# Patient Record
Sex: FEMALE | Race: OTHER | NOT HISPANIC OR LATINO | URBAN - METROPOLITAN AREA
[De-identification: names, ages, dates, MRNs, and addresses within clinical notes are randomized per-mention and may not be internally consistent; named-entity substitution may affect disease eponyms.]

---

## 2024-02-22 ENCOUNTER — INPATIENT (INPATIENT)
Facility: HOSPITAL | Age: 34
LOS: 4 days | Discharge: HOME | End: 2024-02-27
Attending: OBSTETRICS & GYNECOLOGY | Admitting: OBSTETRICS & GYNECOLOGY
Payer: MEDICAID

## 2024-02-22 VITALS
RESPIRATION RATE: 18 BRPM | HEART RATE: 87 BPM | OXYGEN SATURATION: 98 % | TEMPERATURE: 98 F | SYSTOLIC BLOOD PRESSURE: 119 MMHG | DIASTOLIC BLOOD PRESSURE: 60 MMHG

## 2024-02-22 DIAGNOSIS — O26.899 OTHER SPECIFIED PREGNANCY RELATED CONDITIONS, UNSPECIFIED TRIMESTER: ICD-10-CM

## 2024-02-22 DIAGNOSIS — O14.90 UNSPECIFIED PRE-ECLAMPSIA, UNSPECIFIED TRIMESTER: ICD-10-CM

## 2024-02-22 DIAGNOSIS — Z34.80 ENCOUNTER FOR SUPERVISION OF OTHER NORMAL PREGNANCY, UNSPECIFIED TRIMESTER: ICD-10-CM

## 2024-02-22 DIAGNOSIS — Z90.49 ACQUIRED ABSENCE OF OTHER SPECIFIED PARTS OF DIGESTIVE TRACT: Chronic | ICD-10-CM

## 2024-02-22 LAB
ALBUMIN SERPL ELPH-MCNC: 2.5 G/DL — LOW (ref 3.5–5)
ALP SERPL-CCNC: 93 U/L — SIGNIFICANT CHANGE UP (ref 40–120)
ALT FLD-CCNC: 23 U/L DA — SIGNIFICANT CHANGE UP (ref 10–60)
ANION GAP SERPL CALC-SCNC: 6 MMOL/L — SIGNIFICANT CHANGE UP (ref 5–17)
APPEARANCE UR: CLEAR — SIGNIFICANT CHANGE UP
APTT BLD: 25.5 SEC — SIGNIFICANT CHANGE UP (ref 24.5–35.6)
AST SERPL-CCNC: 16 U/L — SIGNIFICANT CHANGE UP (ref 10–40)
BACTERIA # UR AUTO: ABNORMAL /HPF
BASOPHILS # BLD AUTO: 0.04 K/UL — SIGNIFICANT CHANGE UP (ref 0–0.2)
BASOPHILS NFR BLD AUTO: 0.3 % — SIGNIFICANT CHANGE UP (ref 0–2)
BILIRUB SERPL-MCNC: 0.2 MG/DL — SIGNIFICANT CHANGE UP (ref 0.2–1.2)
BILIRUB UR-MCNC: NEGATIVE — SIGNIFICANT CHANGE UP
BLD GP AB SCN SERPL QL: SIGNIFICANT CHANGE UP
BUN SERPL-MCNC: 12 MG/DL — SIGNIFICANT CHANGE UP (ref 7–18)
CALCIUM SERPL-MCNC: 9.2 MG/DL — SIGNIFICANT CHANGE UP (ref 8.4–10.5)
CHLORIDE SERPL-SCNC: 108 MMOL/L — SIGNIFICANT CHANGE UP (ref 96–108)
CO2 SERPL-SCNC: 21 MMOL/L — LOW (ref 22–31)
COLOR SPEC: YELLOW — SIGNIFICANT CHANGE UP
COMMENT - URINE: SIGNIFICANT CHANGE UP
CREAT ?TM UR-MCNC: 88 MG/DL — SIGNIFICANT CHANGE UP
CREAT SERPL-MCNC: 0.66 MG/DL — SIGNIFICANT CHANGE UP (ref 0.5–1.3)
DIFF PNL FLD: NEGATIVE — SIGNIFICANT CHANGE UP
EGFR: 119 ML/MIN/1.73M2 — SIGNIFICANT CHANGE UP
EOSINOPHIL # BLD AUTO: 0.37 K/UL — SIGNIFICANT CHANGE UP (ref 0–0.5)
EOSINOPHIL NFR BLD AUTO: 3.2 % — SIGNIFICANT CHANGE UP (ref 0–6)
EPI CELLS # UR: PRESENT
FIBRINOGEN PPP-MCNC: 422 MG/DL — SIGNIFICANT CHANGE UP (ref 200–475)
GLUCOSE SERPL-MCNC: 100 MG/DL — HIGH (ref 70–99)
GLUCOSE UR QL: NEGATIVE MG/DL — SIGNIFICANT CHANGE UP
GRAN CASTS # UR COMP ASSIST: SIGNIFICANT CHANGE UP
HCT VFR BLD CALC: 29.6 % — LOW (ref 34.5–45)
HGB BLD-MCNC: 9.9 G/DL — LOW (ref 11.5–15.5)
HYALINE CASTS # UR AUTO: PRESENT
IMM GRANULOCYTES NFR BLD AUTO: 1 % — HIGH (ref 0–0.9)
INR BLD: 0.9 RATIO — SIGNIFICANT CHANGE UP (ref 0.85–1.18)
KETONES UR-MCNC: NEGATIVE MG/DL — SIGNIFICANT CHANGE UP
LDH SERPL L TO P-CCNC: 149 U/L — SIGNIFICANT CHANGE UP (ref 120–225)
LEUKOCYTE ESTERASE UR-ACNC: ABNORMAL
LYMPHOCYTES # BLD AUTO: 1.91 K/UL — SIGNIFICANT CHANGE UP (ref 1–3.3)
LYMPHOCYTES # BLD AUTO: 16.6 % — SIGNIFICANT CHANGE UP (ref 13–44)
MCHC RBC-ENTMCNC: 29.7 PG — SIGNIFICANT CHANGE UP (ref 27–34)
MCHC RBC-ENTMCNC: 33.4 GM/DL — SIGNIFICANT CHANGE UP (ref 32–36)
MCV RBC AUTO: 88.9 FL — SIGNIFICANT CHANGE UP (ref 80–100)
MONOCYTES # BLD AUTO: 1.27 K/UL — HIGH (ref 0–0.9)
MONOCYTES NFR BLD AUTO: 11 % — SIGNIFICANT CHANGE UP (ref 2–14)
NEUTROPHILS # BLD AUTO: 7.8 K/UL — HIGH (ref 1.8–7.4)
NEUTROPHILS NFR BLD AUTO: 67.9 % — SIGNIFICANT CHANGE UP (ref 43–77)
NITRITE UR-MCNC: NEGATIVE — SIGNIFICANT CHANGE UP
NRBC # BLD: 0 /100 WBCS — SIGNIFICANT CHANGE UP (ref 0–0)
PH UR: 6 — SIGNIFICANT CHANGE UP (ref 5–8)
PLATELET # BLD AUTO: 268 K/UL — SIGNIFICANT CHANGE UP (ref 150–400)
POTASSIUM SERPL-MCNC: 3.6 MMOL/L — SIGNIFICANT CHANGE UP (ref 3.5–5.3)
POTASSIUM SERPL-SCNC: 3.6 MMOL/L — SIGNIFICANT CHANGE UP (ref 3.5–5.3)
PROT ?TM UR-MCNC: 35 MG/DL — HIGH (ref 0–12)
PROT ?TM UR-MCNC: 35 MG/DL — HIGH (ref 0–12)
PROT SERPL-MCNC: 6.5 G/DL — SIGNIFICANT CHANGE UP (ref 6–8.3)
PROT UR-MCNC: ABNORMAL MG/DL
PROT/CREAT UR-RTO: 0.4 RATIO — HIGH (ref 0–0.2)
PROTHROM AB SERPL-ACNC: 10.3 SEC — SIGNIFICANT CHANGE UP (ref 9.5–13)
RBC # BLD: 3.33 M/UL — LOW (ref 3.8–5.2)
RBC # FLD: 13.2 % — SIGNIFICANT CHANGE UP (ref 10.3–14.5)
RBC CASTS # UR COMP ASSIST: 1 /HPF — SIGNIFICANT CHANGE UP (ref 0–4)
SODIUM SERPL-SCNC: 135 MMOL/L — SIGNIFICANT CHANGE UP (ref 135–145)
SP GR SPEC: 1.02 — SIGNIFICANT CHANGE UP (ref 1–1.03)
URATE SERPL-MCNC: 3 MG/DL — SIGNIFICANT CHANGE UP (ref 2.5–7)
UROBILINOGEN FLD QL: 1 MG/DL — SIGNIFICANT CHANGE UP (ref 0.2–1)
WBC # BLD: 11.5 K/UL — HIGH (ref 3.8–10.5)
WBC # FLD AUTO: 11.5 K/UL — HIGH (ref 3.8–10.5)
WBC UR QL: 3 /HPF — SIGNIFICANT CHANGE UP (ref 0–5)

## 2024-02-22 RX ORDER — CHLORHEXIDINE GLUCONATE 213 G/1000ML
1 SOLUTION TOPICAL DAILY
Refills: 0 | Status: DISCONTINUED | OUTPATIENT
Start: 2024-02-22 | End: 2024-02-25

## 2024-02-22 RX ORDER — OXYTOCIN 10 UNIT/ML
333.33 VIAL (ML) INJECTION
Qty: 20 | Refills: 0 | Status: COMPLETED | OUTPATIENT
Start: 2024-02-22

## 2024-02-22 RX ORDER — SODIUM CHLORIDE 9 MG/ML
1000 INJECTION, SOLUTION INTRAVENOUS
Refills: 0 | Status: DISCONTINUED | OUTPATIENT
Start: 2024-02-22 | End: 2024-02-25

## 2024-02-22 RX ORDER — CITRIC ACID/SODIUM CITRATE 300-500 MG
15 SOLUTION, ORAL ORAL EVERY 6 HOURS
Refills: 0 | Status: DISCONTINUED | OUTPATIENT
Start: 2024-02-22 | End: 2024-02-25

## 2024-02-22 NOTE — OB PROVIDER LABOR PROGRESS NOTE - ASSESSMENT
a/p siup @ 37w4d miol for pec w/o severe fx , currently stable  - po cytotec  - monitor for si/sx severe preeclampsia  - continue maternal/fetal monitoring  nst reviewed by  belia Mcghee attending

## 2024-02-22 NOTE — OB RN TRIAGE NOTE - BP NONINVASIVE SYSTOLIC (MM HG)
Patient updated, awaiting results. Patient has no further questions or concerns at this time.        Luis Enrique Cox RN  06/03/19 2030 119

## 2024-02-22 NOTE — OB PROVIDER H&P - ASSESSMENT
34 yo  at 37w2d here for mIOL for PEC without severe features  - Category I tracing  - Not in labor  - GBS negative      Plan:  - Admit to L&D  - IOL for PEC without severe features   - Vaginal cytotec per protocol   - Regular diet  - f/u with prenatal labs, requested from Dr. Padron  - d/w covering attending Dr. Bradford

## 2024-02-22 NOTE — OB PROVIDER TRIAGE NOTE - HISTORY OF PRESENT ILLNESS
34 yo  presents at 37w2d by LMP of 23. She was sent by her OB Dr. Padron for an elevated blood pressure in the office, where SBP was in the 150s. Patient denies headache, visual changes, RUQ pain, edema. Endorses +FM. Denies leaking fluid, vaginal bleeding, contractions.     PNC: Care with Dr. Padron. Prenatal course significant for mild anemia in pregnany, taking PO iron supplements.     OB Hx: Remote hx of medical TOP x2. Baby girl born 2014, FT, uncomplicated  GYN Hx: Herpes type 1&2 on serum. Patient denies history of genital herpes outbreak. Last oral herpes outbreak in . Denies fibroids, ovarian cysts, abnormal paps.   PMH: Denies  Meds: PNV, PO Iron   PSHx: appendectomy in   Social: denies tobacco, alcohol, and drug use  Psych: denies anxiety, depression, trauma. Feels safe at home.   Height: 5'2"  Weight: 225lbs

## 2024-02-22 NOTE — OB PROVIDER H&P - NSHPPHYSICALEXAM_GEN_ALL_CORE
Gen: alert and oriented  Abd: gravid, nontender, no guarding  Ext: no edema  Resp: denies SOB      SVE at 1750: c/l/h  Speculum exam negative for genital HSV lesions

## 2024-02-22 NOTE — OB PROVIDER H&P - HISTORY OF PRESENT ILLNESS
34 yo  presents at 37w2d by LMP of 23. She was sent by her OB Dr. Padron for an elevated blood pressure in the office, where SBP was in the 150s. Patient denies headache, visual changes, RUQ pain, edema. Endorses +FM. Denies leaking fluid, vaginal bleeding, contractions.     PNC: Care with Dr. Padron. Prenatal course significant for mild anemia in pregnany, taking PO iron supplements.     OB Hx: Remote hx of medical TOP x2. Baby girl born 2014, FT, uncomplicated  GYN Hx: HSV type 1&2 positive. Patient denies history of genital herpes outbreak. Last oral herpes outbreak in . Denies fibroids, ovarian cysts, abnormal paps.   PMH: Denies  Meds: PNV, PO Iron   PSHx: appendectomy in   Social: denies tobacco, alcohol, and drug use  Psych: denies anxiety, depression, trauma. Feels safe at home.   Height: 5'2"  Weight: 225lbs

## 2024-02-22 NOTE — OB PROVIDER H&P - NSHPLABSRESULTS_GEN_ALL_CORE
Preeclampsia labs  Routine admission labs    Varicella NI  Third trimester HIV nonreactive  Hep B nonreactive  Hep C nonreactive  Thrid trimester RPR non-reactive

## 2024-02-22 NOTE — OB PROVIDER H&P - PROBLEM SELECTOR PLAN 1
Plan:  - Admit to L&D  - IOL for PEC without severe features   - Vaginal cytotec per protocol   - Regular diet  - f/u with prenatal labs, requested from Dr. Padron  - d/w covering attending Dr. Bradford

## 2024-02-22 NOTE — OB RN TRIAGE NOTE - NS_OBGYNHISTORY_OBGYN_ALL_OB_FT
TOPx2 with pill  anemia this pregnancy  HSV1&2 positive, no active lesions  Appendectomy    2013 FT girl Uncomplicated

## 2024-02-22 NOTE — OB PROVIDER H&P - ALERT: PERTINENT HISTORY
1st Trimester Sonogram/20 Week Level II Sonogram/BioPhysical Profile(s)/Follow up Sonogram for Growth

## 2024-02-22 NOTE — OB PROVIDER TRIAGE NOTE - NSOBPROVIDERNOTE_OBGYN_ALL_OB_FT
34 yo  presents at 37w2d by LMP of 23 here for evaluation of elevated blood pressure in her OB office.     Plan  - Preeclampsia labs  - Monitor BP q15 minutes  - Close monitoring of materna/fetal wellbeing 34 yo  presents at 37w2d by LMP of 23 here for evaluation of elevated blood pressure in her OB office.     Plan  - Preeclampsia labs  - Monitor BP q15 minutes  - Close monitoring of materna/fetal wellbeing      Update at 1700:  Category I tracing  PC ratio 0.4, with x2 elevated BPs patient rules in for preeclampsia  Discussed case with NATALIO Hong who recommends mIOL  Plan to admit to L&D

## 2024-02-23 LAB — T PALLIDUM AB TITR SER: NEGATIVE — SIGNIFICANT CHANGE UP

## 2024-02-23 RX ADMIN — SODIUM CHLORIDE 125 MILLILITER(S): 9 INJECTION, SOLUTION INTRAVENOUS at 13:09

## 2024-02-23 RX ADMIN — SODIUM CHLORIDE 125 MILLILITER(S): 9 INJECTION, SOLUTION INTRAVENOUS at 00:16

## 2024-02-23 NOTE — OB PROVIDER LABOR PROGRESS NOTE - ASSESSMENT
34 y/o  @ 37 weeks 4 days presents for miol PEC w/o SF. Induction continue with PO cytotec 20mg #2.   CAT I     - continue fetal/maternal monitoring   - pain management per aesthesia  - mtm reg diet   - pih labs negative   - d/w Dr. Tubbs

## 2024-02-23 NOTE — OB PROVIDER LABOR PROGRESS NOTE - ASSESSMENT
a/p MIOL preeclampsia w/o severe features 37 weeks 4 days, stable  continue PO cytotec protocol  monitor BP  venodynes while in bed  maternal/fetal monitoring  case master burger  dw Dr. Ann at bedside

## 2024-02-23 NOTE — OB PROVIDER LABOR PROGRESS NOTE - ASSESSMENT
32yo  @37.4wks admitted for miol for preeclampsia without severe features, gbs neg, efw 3000, pt stable   - cont po cytotec per protocol   - cont close maternal and fetal monitoring  - pain management per pt request  - meal to meal and ambulation privileges  - discussed with dr. leo celaya attending

## 2024-02-23 NOTE — OB PROVIDER LABOR PROGRESS NOTE - ASSESSMENT
34yo  @37.4wks admitted for miol for preeclampsia without severe features, gbs neg, efw 3000, pt stable   - cont po cytotec per protocol   - cont close maternal and fetal monitoring  - pain management per pt request  - meal to meal and ambulation privileges  - discussed with dr. leo celaya attending

## 2024-02-23 NOTE — OB PROVIDER LABOR PROGRESS NOTE - NS_SUBJECTIVE/OBJECTIVE_OBGYN_ALL_OB_FT
Patient seen and reevaluated at bedside with Dr. Ann house attending and offers no complaints at this time. Received #5 PO cytotec 60mcg.   /70  abd gravid soft, nontender  dtr +2 b/l

## 2024-02-23 NOTE — OB PROVIDER LABOR PROGRESS NOTE - NS_SUBJECTIVE/OBJECTIVE_OBGYN_ALL_OB_FT
Patient remains comfortable and offers no complaints at this time.     Vital Signs Last 24 Hrs  T(C): 37 (22 Feb 2024 17:56), Max: 37 (22 Feb 2024 17:56)  T(F): 98.6 (22 Feb 2024 17:56), Max: 98.6 (22 Feb 2024 17:56)  HR: 88 (22 Feb 2024 17:56) (87 - 88)  BP: 131/66 (22 Feb 2024 17:56) (119/60 - 131/66)  BP(mean): --  RR: 18 (22 Feb 2024 17:56) (18 - 18)  SpO2: 98% (22 Feb 2024 17:56) (98% - 98%)    Parameters below as of 22 Feb 2024 17:56  Patient On (Oxygen Delivery Method): room air    SVE: deferred  Tracing: baseline 130bpm, moderate variability ( difficult d/t loss of contact)    [place]: irregular

## 2024-02-24 LAB — ABO RH CONFIRMATION: SIGNIFICANT CHANGE UP

## 2024-02-24 RX ORDER — CITRIC ACID/SODIUM CITRATE 300-500 MG
30 SOLUTION, ORAL ORAL ONCE
Refills: 0 | Status: DISCONTINUED | OUTPATIENT
Start: 2024-02-24 | End: 2024-02-24

## 2024-02-24 RX ORDER — CALAMINE AND ZINC OXIDE AND PHENOL 160; 10 MG/ML; MG/ML
1 LOTION TOPICAL
Refills: 0 | Status: DISCONTINUED | OUTPATIENT
Start: 2024-02-24 | End: 2024-02-27

## 2024-02-24 RX ORDER — DIPHENHYDRAMINE HCL 50 MG
25 CAPSULE ORAL ONCE
Refills: 0 | Status: COMPLETED | OUTPATIENT
Start: 2024-02-24 | End: 2024-02-24

## 2024-02-24 RX ORDER — OXYTOCIN 10 UNIT/ML
333.33 VIAL (ML) INJECTION
Qty: 20 | Refills: 0 | Status: DISCONTINUED | OUTPATIENT
Start: 2024-02-24 | End: 2024-02-25

## 2024-02-24 RX ORDER — DIPHENHYDRAMINE HCL 50 MG
50 CAPSULE ORAL ONCE
Refills: 0 | Status: COMPLETED | OUTPATIENT
Start: 2024-02-24 | End: 2024-02-24

## 2024-02-24 RX ORDER — CEFAZOLIN SODIUM 1 G
2000 VIAL (EA) INJECTION ONCE
Refills: 0 | Status: DISCONTINUED | OUTPATIENT
Start: 2024-02-24 | End: 2024-02-24

## 2024-02-24 RX ORDER — OXYTOCIN 10 UNIT/ML
VIAL (ML) INJECTION
Qty: 30 | Refills: 0 | Status: DISCONTINUED | OUTPATIENT
Start: 2024-02-24 | End: 2024-02-24

## 2024-02-24 RX ORDER — DIPHENHYDRAMINE HCL 50 MG
25 CAPSULE ORAL ONCE
Refills: 0 | Status: DISCONTINUED | OUTPATIENT
Start: 2024-02-24 | End: 2024-02-24

## 2024-02-24 RX ADMIN — SODIUM CHLORIDE 125 MILLILITER(S): 9 INJECTION, SOLUTION INTRAVENOUS at 02:54

## 2024-02-24 RX ADMIN — Medication 50 MILLIGRAM(S): at 13:20

## 2024-02-24 RX ADMIN — Medication 25 MILLIGRAM(S): at 11:57

## 2024-02-24 RX ADMIN — CALAMINE AND ZINC OXIDE AND PHENOL 1 APPLICATION(S): 160; 10 LOTION TOPICAL at 13:55

## 2024-02-24 RX ADMIN — Medication 50 MILLIGRAM(S): at 22:55

## 2024-02-24 RX ADMIN — Medication 2 MILLIUNIT(S)/MIN: at 16:48

## 2024-02-24 NOTE — OB PROVIDER LABOR PROGRESS NOTE - ASSESSMENT
A/P 34 yo  presenting for miol for pec w/o svr fxs. Patient is stable.  - cont close maternal and fetal monitoring  - start pitocin  d/w Dr. Suzette celaya attending

## 2024-02-24 NOTE — OB PROVIDER LABOR PROGRESS NOTE - NS_OBIHIFHRDETAILS_OBGYN_ALL_OB_FT
Baseline: 130 bpm  Variability: Moderate variability  Accelerations: Present  Decelerations: Absent    Cat I tracing, reactive

## 2024-02-24 NOTE — OB PROVIDER LABOR PROGRESS NOTE - NS_SUBJECTIVE/OBJECTIVE_OBGYN_ALL_OB_FT
Patient seen and examined at bedside, offers no new complaints. Patient does not desire pain management at this time.    vss  Gen: Pt appears well, nad  Abd: Gravid, Nontender, not guarding  Ext: No edema  SVE: 1/50/-3/intact cook balloon placed filled to 80cc each, secured to patient's right leg on tension, vaginal cytotec placed

## 2024-02-24 NOTE — OB PROVIDER LABOR PROGRESS NOTE - NS_SUBJECTIVE/OBJECTIVE_OBGYN_ALL_OB_FT
Patient seen and reevaluated with Dr. Tubbs house attending at bedside. Patient is comfortable with epidural in place. Pitocin at 10mu   /56  abd gravid, soft, nontender  dtr +2 b/l   sve by Dr Tubbs 4/40/-4/vtx/ise/iupc in place   nst baseline 130, moderate variability, accels, no decel  toco irregular

## 2024-02-24 NOTE — OB PROVIDER LABOR PROGRESS NOTE - NS_SUBJECTIVE/OBJECTIVE_OBGYN_ALL_OB_FT
Patient reevaluated at bedside, offers no complaints. Instructed to place vaginal cytotec  #1 vaginal cytotec placed in posterior fornix  nst cat I  toco irregular

## 2024-02-24 NOTE — OB PROVIDER LABOR PROGRESS NOTE - NS_SUBJECTIVE/OBJECTIVE_OBGYN_ALL_OB_FT
Patient seen and examined at bedside, offers no new complaints. Epidural in place and providing adequate pain relief.    vss  Gen: Pt appears well, nad  Abd: Gravid, Nontender, not guarding  Ext: No edema  SVE: 4-5/70/-3/ intact/vtx balloon removed vaginal cytotec placed

## 2024-02-24 NOTE — OB PROVIDER LABOR PROGRESS NOTE - NS_OBIHIFHRDETAILS_OBGYN_ALL_OB_FT
Baseline: 140bpm  Variability: Moderate variability  Accelerations: Present  Decelerations: Absent    Cat I tracing, reactive

## 2024-02-24 NOTE — OB PROVIDER LABOR PROGRESS NOTE - NS_SUBJECTIVE/OBJECTIVE_OBGYN_ALL_OB_FT
Patient seen and examined at bedside, offers no new complaints. Epidural in place and providing adequate pain relief.    vss  Gen: Pt appears well, nad  Abd: Gravid, Nontender, not guarding  Ext: No edema  SVE: 4-5/90/-1 arom clr Patient seen and examined at bedside, offers no new complaints. Epidural in place and providing adequate pain relief.    vss  Gen: Pt appears well, nad  Abd: Gravid, Nontender, not guarding  Ext: No edema  SVE: 5-6/90/-1 arom clr

## 2024-02-24 NOTE — OB PROVIDER LABOR PROGRESS NOTE - NS_OBIHIFHRDETAILS_OBGYN_ALL_OB_FT
Baseline: 125 bpm  Variability: Moderate variability  Accelerations: Present  Decelerations: Absent    Cat I tracing, reactive

## 2024-02-24 NOTE — OB PROVIDER LABOR PROGRESS NOTE - NS_OBIHIFHRDETAILS_OBGYN_ALL_OB_FT
Baseline: ** bpm  Variability: Moderate variability  Accelerations: Present  Decelerations: Absent    Cat I tracing, reactive Baseline: 125 bpm, loss of contact  Variability: Moderate variability  Accelerations: Present  Decelerations: Absent    Cat I tracing, reactive no

## 2024-02-24 NOTE — OB PROVIDER LABOR PROGRESS NOTE - NS_SUBJECTIVE/OBJECTIVE_OBGYN_ALL_OB_FT
Patient seen and examined at bedside, offers no new complaints. Epidural in place and offering adequate pain control.    vss  Gen: Pt appears well, nad  Abd: Gravid, Nontender, not guarding  Ext: No edema  SVE: 5-6/70/-2 IUPC/FSE placed arom clear

## 2024-02-24 NOTE — OB PROVIDER LABOR PROGRESS NOTE - ASSESSMENT
continue current mngt  monitor BP   maternal./fetal monitoring  [place] Ann [place] attending

## 2024-02-24 NOTE — OB PROVIDER LABOR PROGRESS NOTE - NS_SUBJECTIVE/OBJECTIVE_OBGYN_ALL_OB_FT
Patient seen and reevaluated at bedside, offers no complaints. Denies headache, blurred vision, epigastric pain, RUQ pain, leg pain, CP, SOB.  /79  #2 vaginal cytotec placed in posterior fornix  nst baseline 130, moderate variability, accel, no decel  toco irregular

## 2024-02-24 NOTE — OB PROVIDER LABOR PROGRESS NOTE - ASSESSMENT
A/P 32yo  presenting for induction of labor for PEC without svr fxs. Patient is stable.  - cont close maternal and fetal monitoring  - pain management per patient request  - cont vaginal cytotec  - cont bp monitoring  - cont monitoring cook balloon progress  d/w Dr. Suzette celaya attending

## 2024-02-24 NOTE — OB PROVIDER LABOR PROGRESS NOTE - ASSESSMENT
34yo  @37w5d admitted for miol for preeclampsia w/o SF. pt stable   - PO cytotec protocol complete   - Vaginal cytotec #1 placed, pt tolerated well  - continuous maternal and fetal monitoring  - pain management per pt request  - meal to meal and ambulation privileges    - d/w dr benitez

## 2024-02-24 NOTE — OB PROVIDER LABOR PROGRESS NOTE - ASSESSMENT
A/P 32 yo  presenting for miol for pec without. Patient is stable.  - cont close maternal and fetal monitoring  - for vaginal cytotec  d/w Dr. Suzette celaya attending

## 2024-02-24 NOTE — OB PROVIDER LABOR PROGRESS NOTE - ASSESSMENT
A/P 32yo  presenting for miol for pec without severe features. Patient is stable.  - cont close maternal and fetal monitoring  - cont increasing pitocin per [facility]  d/w Dr. Suzette celaya attending

## 2024-02-24 NOTE — OB PROVIDER LABOR PROGRESS NOTE - NS_SUBJECTIVE/OBJECTIVE_OBGYN_ALL_OB_FT
Patient seen and evaluated at bedside, c/o pressure   /89  #2 vaginal cytotec placed in posterior fornix  nst baseline 145, moderate variability, accels, no decel  toco irregular

## 2024-02-24 NOTE — OB PROVIDER LABOR PROGRESS NOTE - NS_SUBJECTIVE/OBJECTIVE_OBGYN_ALL_OB_FT
Patient seen and examined at bedside, offers no new complaints. Epidural in place providing adequate pain relief.    vss  Gen: Pt appears well, nad  Abd: Gravid, Nontender, not guarding  Ext: No edema  SVE: 5-6/60/-3 arom clr

## 2024-02-24 NOTE — OB PROVIDER LABOR PROGRESS NOTE - ASSESSMENT
A/P 32yo  presenting for miol for PEC w/out. Patient is stable.  - cont close maternal and fetal monitoring  - for pitocin if augmentation needed  d/w Dr. Suzette celaya attending A/P 34yo  presenting for miol for PEC w/out. Patient is stable.  - cont close maternal and fetal monitoring  - cont pitocin  d/w Dr. Suzette celaya attending

## 2024-02-25 DEVICE — BLLN CERV RIPENING WITH STYLET 40CM 10 EA/BX: Type: IMPLANTABLE DEVICE | Status: FUNCTIONAL

## 2024-02-25 RX ORDER — IBUPROFEN 200 MG
600 TABLET ORAL EVERY 6 HOURS
Refills: 0 | Status: COMPLETED | OUTPATIENT
Start: 2024-02-25 | End: 2025-01-23

## 2024-02-25 RX ORDER — TETANUS TOXOID, REDUCED DIPHTHERIA TOXOID AND ACELLULAR PERTUSSIS VACCINE, ADSORBED 5; 2.5; 8; 8; 2.5 [IU]/.5ML; [IU]/.5ML; UG/.5ML; UG/.5ML; UG/.5ML
0.5 SUSPENSION INTRAMUSCULAR ONCE
Refills: 0 | Status: DISCONTINUED | OUTPATIENT
Start: 2024-02-25 | End: 2024-02-27

## 2024-02-25 RX ORDER — HEPARIN SODIUM 5000 [USP'U]/ML
5000 INJECTION INTRAVENOUS; SUBCUTANEOUS EVERY 8 HOURS
Refills: 0 | Status: DISCONTINUED | OUTPATIENT
Start: 2024-02-26 | End: 2024-02-27

## 2024-02-25 RX ORDER — CITRIC ACID/SODIUM CITRATE 300-500 MG
30 SOLUTION, ORAL ORAL ONCE
Refills: 0 | Status: DISCONTINUED | OUTPATIENT
Start: 2024-02-25 | End: 2024-02-25

## 2024-02-25 RX ORDER — OXYCODONE HYDROCHLORIDE 5 MG/1
5 TABLET ORAL
Refills: 0 | Status: COMPLETED | OUTPATIENT
Start: 2024-02-25 | End: 2024-03-03

## 2024-02-25 RX ORDER — SODIUM CHLORIDE 9 MG/ML
1000 INJECTION, SOLUTION INTRAVENOUS
Refills: 0 | Status: DISCONTINUED | OUTPATIENT
Start: 2024-02-25 | End: 2024-02-27

## 2024-02-25 RX ORDER — FERROUS SULFATE 325(65) MG
325 TABLET ORAL DAILY
Refills: 0 | Status: DISCONTINUED | OUTPATIENT
Start: 2024-02-25 | End: 2024-02-26

## 2024-02-25 RX ORDER — LANOLIN
1 OINTMENT (GRAM) TOPICAL EVERY 6 HOURS
Refills: 0 | Status: DISCONTINUED | OUTPATIENT
Start: 2024-02-25 | End: 2024-02-27

## 2024-02-25 RX ORDER — OXYTOCIN 10 UNIT/ML
VIAL (ML) INJECTION
Qty: 30 | Refills: 0 | Status: DISCONTINUED | OUTPATIENT
Start: 2024-02-25 | End: 2024-02-25

## 2024-02-25 RX ORDER — CEFAZOLIN SODIUM 1 G
3000 VIAL (EA) INJECTION ONCE
Refills: 0 | Status: COMPLETED | OUTPATIENT
Start: 2024-02-25 | End: 2024-02-25

## 2024-02-25 RX ORDER — OXYTOCIN 10 UNIT/ML
333.33 VIAL (ML) INJECTION
Qty: 20 | Refills: 0 | Status: DISCONTINUED | OUTPATIENT
Start: 2024-02-25 | End: 2024-02-27

## 2024-02-25 RX ORDER — MORPHINE SULFATE 50 MG/1
3 CAPSULE, EXTENDED RELEASE ORAL ONCE
Refills: 0 | Status: DISCONTINUED | OUTPATIENT
Start: 2024-02-25 | End: 2024-02-27

## 2024-02-25 RX ORDER — AZITHROMYCIN 500 MG/1
500 TABLET, FILM COATED ORAL ONCE
Refills: 0 | Status: COMPLETED | OUTPATIENT
Start: 2024-02-25 | End: 2024-02-25

## 2024-02-25 RX ORDER — KETOROLAC TROMETHAMINE 30 MG/ML
30 SYRINGE (ML) INJECTION EVERY 6 HOURS
Refills: 0 | Status: DISCONTINUED | OUTPATIENT
Start: 2024-02-25 | End: 2024-02-26

## 2024-02-25 RX ORDER — SIMETHICONE 80 MG/1
80 TABLET, CHEWABLE ORAL EVERY 4 HOURS
Refills: 0 | Status: DISCONTINUED | OUTPATIENT
Start: 2024-02-25 | End: 2024-02-27

## 2024-02-25 RX ORDER — MAGNESIUM HYDROXIDE 400 MG/1
30 TABLET, CHEWABLE ORAL
Refills: 0 | Status: DISCONTINUED | OUTPATIENT
Start: 2024-02-25 | End: 2024-02-27

## 2024-02-25 RX ORDER — FAMOTIDINE 10 MG/ML
20 INJECTION INTRAVENOUS ONCE
Refills: 0 | Status: COMPLETED | OUTPATIENT
Start: 2024-02-25 | End: 2024-02-25

## 2024-02-25 RX ORDER — DIPHENHYDRAMINE HCL 50 MG
25 CAPSULE ORAL EVERY 6 HOURS
Refills: 0 | Status: COMPLETED | OUTPATIENT
Start: 2024-02-25 | End: 2025-01-23

## 2024-02-25 RX ORDER — FOLIC ACID 0.8 MG
1 TABLET ORAL DAILY
Refills: 0 | Status: DISCONTINUED | OUTPATIENT
Start: 2024-02-25 | End: 2024-02-27

## 2024-02-25 RX ORDER — ACETAMINOPHEN 500 MG
975 TABLET ORAL
Refills: 0 | Status: DISCONTINUED | OUTPATIENT
Start: 2024-02-25 | End: 2024-02-27

## 2024-02-25 RX ORDER — DIPHENHYDRAMINE HCL 50 MG
25 CAPSULE ORAL EVERY 6 HOURS
Refills: 0 | Status: DISCONTINUED | OUTPATIENT
Start: 2024-02-25 | End: 2024-02-27

## 2024-02-25 RX ADMIN — FAMOTIDINE 20 MILLIGRAM(S): 10 INJECTION INTRAVENOUS at 10:23

## 2024-02-25 RX ADMIN — Medication 25 MILLIGRAM(S): at 17:21

## 2024-02-25 RX ADMIN — Medication 975 MILLIGRAM(S): at 23:12

## 2024-02-25 RX ADMIN — Medication 30 MILLIGRAM(S): at 18:08

## 2024-02-25 RX ADMIN — Medication 975 MILLIGRAM(S): at 22:12

## 2024-02-25 RX ADMIN — SIMETHICONE 80 MILLIGRAM(S): 80 TABLET, CHEWABLE ORAL at 22:12

## 2024-02-25 RX ADMIN — Medication 30 MILLIGRAM(S): at 17:08

## 2024-02-25 RX ADMIN — Medication 15 MILLILITER(S): at 10:29

## 2024-02-25 RX ADMIN — AZITHROMYCIN 255 MILLIGRAM(S): 500 TABLET, FILM COATED ORAL at 10:23

## 2024-02-25 RX ADMIN — Medication 200 MILLIGRAM(S): at 11:04

## 2024-02-25 RX ADMIN — Medication 1000 MILLIUNIT(S)/MIN: at 11:20

## 2024-02-25 RX ADMIN — SIMETHICONE 80 MILLIGRAM(S): 80 TABLET, CHEWABLE ORAL at 17:08

## 2024-02-25 NOTE — OB PROVIDER DELIVERY SUMMARY - NSPROVIDERDELIVERYNOTE_OBGYN_ALL_OB_FT
section without complications, baby with Apgars 9&9 at 1&5 min. Good hemostasis at end of procedure.

## 2024-02-25 NOTE — OB PROVIDER LABOR PROGRESS NOTE - NS_SUBJECTIVE/OBJECTIVE_OBGYN_ALL_OB_FT
patient see at bedside with Dr Suly beckford 5.5/70/-3 with caput patient see at bedside with Dr Tubbs  bp 124/79  hr 115  sve 5.5/70/-3 with caput patient see at bedside with belia Mcghee attending  bp 124/79  hr 115  sve 5.5/70/-3 with caput

## 2024-02-25 NOTE — CHART NOTE - NSCHARTNOTEFT_GEN_A_CORE
Patient seen and evaluated at bedside, resting comfortably w/o any acute complaints. Tolerant of clear liquid diet.  voiding clear urine into olmas. Denies n/v, fever, dizziness, chills, sob, chest pain, or any other concern.  at bedside. attempting to breast feed    Vital Signs Last 24 Hrs  T(C): 37 (2024 15:38), Max: 37.4 (2024 14:30)  T(F): 98.6 (2024 15:38), Max: 99.3 (2024 14:30)  HR: 92 (2024 15:38) (86 - 97)  BP: 131/76 (2024 15:38) (114/57 - 137/74)  BP(mean): 91 (2024 14:00) (76 - 91)  RR: 19 (2024 15:38) (12 - 20)  SpO2: 98% (2024 15:38) (98% - 100%)    exam  gen: nad, aa+o x 3  abd: soft, non tender,  fundus firm,  dressing in place, clean, dry and intact  gyn: minimal lochia  ext: venodynes in place    a/p 34 yo  F s/p primary  for failure to progress, admitted for miol for  PEC w/o severe, POD 0, currently stable  --dc lomas 12 hour post op  -- case mangement consult ordered  --pain management prn  -- incentive spirometer  --Venodyne heparin for vte prophylaxis  -- continue post op care  Dr Tubbs, house attending notified

## 2024-02-25 NOTE — OB NEONATOLOGY/PEDIATRICIAN DELIVERY SUMMARY - NSPEDSNEONOTESA_OBGYN_ALL_OB_FT
Neonatologist called to attend this  for failure to progress. The infant presented cephalic, emerged vigorous with spontaneous cry, good tone. Delayed cord clamping was performed. Warmed, dried.

## 2024-02-25 NOTE — OB PROVIDER LABOR PROGRESS NOTE - ASSESSMENT
anesthesia called for top off  patient repositioned with peanut ball  npo  continuous monitoring  venodynes  dw Dr. Rimarachin house attending

## 2024-02-25 NOTE — OB PROVIDER LABOR PROGRESS NOTE - NS_SUBJECTIVE/OBJECTIVE_OBGYN_ALL_OB_FT
Patient c/o pain. occassional variable decel noted   /60  sve 5/70/-4/vtx/iupc/ise  nst baseline 140, moderate variability, occassional variable decel with spontaneous recovery to baseline   toco irregular

## 2024-02-25 NOTE — OB RN INTRAOPERATIVE NOTE - NSSELHIDDEN_OBGYN_ALL_OB_FT
[NS_DeliveryAttending1_OBGYN_ALL_OB_FT:[id number]==],[NS_DeliveryAssist1_OBGYN_ALL_OB_FT:[id number]]

## 2024-02-25 NOTE — OB RN DELIVERY SUMMARY - NS_SEPSISRSKCALC_OBGYN_ALL_OB_FT
EOS calculated successfully. EOS Risk Factor: 0.5/1000 live births ([facility] national incidence); GA=37w5d; Temp=98.6; ROM=20.283; GBS='Negative'; Antibiotics='No antibiotics or any antibiotics < 2 hrs prior to birth'

## 2024-02-25 NOTE — OB PROVIDER LABOR PROGRESS NOTE - NS_SUBJECTIVE/OBJECTIVE_OBGYN_ALL_OB_FT
pt examined at bedside laying on left lateral side with peanut ball btwn legs, complaining of light contractions, epidural in place pt examined at bedside laying on left lateral side with peanut ball btwn legs, complaining of light contractions, epidural in place  examined by Dr. Tubbs

## 2024-02-25 NOTE — OB PROVIDER LABOR PROGRESS NOTE - NS_OBIHICONTRACTIONPATTERNDETAILS_OBGYN_ALL_OB_FT
irreg
q 3-4 min
toco q irregular
thaniao anup occasional
irreg
irregular
q3m
thaniao anup occasional
anup coloradog
irreg
q2-3mins
occassional
q2m
q 2-4 minutes

## 2024-02-25 NOTE — OB RN DELIVERY SUMMARY - NSSELHIDDEN_OBGYN_ALL_OB_FT
[NS_DeliveryAttending1_OBGYN_ALL_OB_FT:[id number]==],[NS_DeliveryAssist1_OBGYN_ALL_OB_FT:[id number]]

## 2024-02-25 NOTE — OB PROVIDER LABOR PROGRESS NOTE - ASSESSMENT
a/p siup @ 37w6d miol for preeclampsia w/o sv fx  HD#4  Dr Tubbs discussed primary  with patient given no cervical change  risk/benefits discussed at length.   patient and partner agrees  anesthesia notified  deepika notified  2 units ready   a/p siup @ 37w6d miol for preeclampsia w/o sv fx  HD#4  Dr Tubbs discussed primary  with patient given no cervical change  risk/benefits discussed at length.   patient and partner agrees to proceed with primary   anesthesia notified  deepika notified  2 units ready

## 2024-02-25 NOTE — OB PROVIDER LABOR PROGRESS NOTE - ASSESSMENT
a/p:  siup @ 37w6d HD#4 mIOL for PEC w/o SF currently stable  - continue Pitocin augmentation per protocol  - continue close maternal/fetal monitoring   per DR Tubbs, next exam 10am

## 2024-02-25 NOTE — OB PROVIDER LABOR PROGRESS NOTE - NSVAGINALEXAM_OBGYN_ALL_OB_DT
24-Feb-2024 07:53
23-Feb-2024 09:21
25-Feb-2024
25-Feb-2024 06:30
24-Feb-2024 00:58
24-Feb-2024 18:10
23-Feb-2024
24-Feb-2024 15:14
24-Feb-2024 17:27
23-Feb-2024 17:22

## 2024-02-25 NOTE — OB PROVIDER DELIVERY SUMMARY - NSSELHIDDEN_OBGYN_ALL_OB_FT
[NS_DeliveryAttending1_OBGYN_ALL_OB_FT:[id number]==],[NS_DeliveryAssist1_OBGYN_ALL_OB_FT:[id number]]

## 2024-02-25 NOTE — OB RN DELIVERY SUMMARY - NS_BREASTACTIONA_OBGYN_ALL_OB
Plan: Patient declines treatment with Efudex at this time will discuss in the future if he would prefer to use topicals on his arms and backs of hands Detail Level: Simple No action was needed

## 2024-02-25 NOTE — OB PROVIDER LABOR PROGRESS NOTE - ASSESSMENT
a/p: 33y  HD#4 admitted for mIOL for PEC w/o SF  - cont current management  - epidural in place for pain management  - IUPC and FSE in place  - cont close monitoring  - d/w Dr. Tubbs a/p: 33y  HD#4 admitted for mIOL for PEC w/o SF  - pitocin  - cont current management  - epidural in place for pain management  - IUPC and FSE in place  - cont close monitoring  - d/w Dr. Tubbs

## 2024-02-26 DIAGNOSIS — T81.89XA OTHER COMPLICATIONS OF PROCEDURES, NOT ELSEWHERE CLASSIFIED, INITIAL ENCOUNTER: ICD-10-CM

## 2024-02-26 LAB
APPEARANCE UR: CLEAR — SIGNIFICANT CHANGE UP
BILIRUB UR-MCNC: NEGATIVE — SIGNIFICANT CHANGE UP
COLOR SPEC: YELLOW — SIGNIFICANT CHANGE UP
DIFF PNL FLD: ABNORMAL
GLUCOSE UR QL: NEGATIVE MG/DL — SIGNIFICANT CHANGE UP
HCT VFR BLD CALC: 21.1 % — LOW (ref 34.5–45)
HGB BLD-MCNC: 7.1 G/DL — LOW (ref 11.5–15.5)
KETONES UR-MCNC: NEGATIVE MG/DL — SIGNIFICANT CHANGE UP
LEUKOCYTE ESTERASE UR-ACNC: ABNORMAL
MCHC RBC-ENTMCNC: 29.8 PG — SIGNIFICANT CHANGE UP (ref 27–34)
MCHC RBC-ENTMCNC: 33.8 GM/DL — SIGNIFICANT CHANGE UP (ref 32–36)
MCV RBC AUTO: 88.2 FL — SIGNIFICANT CHANGE UP (ref 80–100)
NITRITE UR-MCNC: NEGATIVE — SIGNIFICANT CHANGE UP
NRBC # BLD: 0 /100 WBCS — SIGNIFICANT CHANGE UP (ref 0–0)
PH UR: 5.5 — SIGNIFICANT CHANGE UP (ref 5–8)
PLATELET # BLD AUTO: 208 K/UL — SIGNIFICANT CHANGE UP (ref 150–400)
PROT UR-MCNC: NEGATIVE MG/DL — SIGNIFICANT CHANGE UP
RBC # BLD: 2.38 M/UL — LOW (ref 3.8–5.2)
RBC # FLD: 13.8 % — SIGNIFICANT CHANGE UP (ref 10.3–14.5)
SP GR SPEC: 1.01 — SIGNIFICANT CHANGE UP (ref 1–1.03)
UROBILINOGEN FLD QL: 0.2 MG/DL — SIGNIFICANT CHANGE UP (ref 0.2–1)
WBC # BLD: 21.47 K/UL — HIGH (ref 3.8–10.5)
WBC # FLD AUTO: 21.47 K/UL — HIGH (ref 3.8–10.5)

## 2024-02-26 RX ORDER — IBUPROFEN 200 MG
600 TABLET ORAL EVERY 6 HOURS
Refills: 0 | Status: DISCONTINUED | OUTPATIENT
Start: 2024-02-26 | End: 2024-02-27

## 2024-02-26 RX ORDER — ASCORBIC ACID 60 MG
500 TABLET,CHEWABLE ORAL
Refills: 0 | Status: DISCONTINUED | OUTPATIENT
Start: 2024-02-26 | End: 2024-02-27

## 2024-02-26 RX ORDER — FERROUS SULFATE 325(65) MG
325 TABLET ORAL
Refills: 0 | Status: DISCONTINUED | OUTPATIENT
Start: 2024-02-26 | End: 2024-02-27

## 2024-02-26 RX ADMIN — Medication 30 MILLIGRAM(S): at 01:27

## 2024-02-26 RX ADMIN — Medication 975 MILLIGRAM(S): at 21:49

## 2024-02-26 RX ADMIN — Medication 30 MILLIGRAM(S): at 07:06

## 2024-02-26 RX ADMIN — Medication 30 MILLIGRAM(S): at 13:05

## 2024-02-26 RX ADMIN — Medication 600 MILLIGRAM(S): at 18:11

## 2024-02-26 RX ADMIN — Medication 1 MILLIGRAM(S): at 13:05

## 2024-02-26 RX ADMIN — MAGNESIUM HYDROXIDE 30 MILLILITER(S): 400 TABLET, CHEWABLE ORAL at 13:04

## 2024-02-26 RX ADMIN — Medication 30 MILLIGRAM(S): at 14:05

## 2024-02-26 RX ADMIN — HEPARIN SODIUM 5000 UNIT(S): 5000 INJECTION INTRAVENOUS; SUBCUTANEOUS at 09:24

## 2024-02-26 RX ADMIN — Medication 500 MILLIGRAM(S): at 17:11

## 2024-02-26 RX ADMIN — Medication 975 MILLIGRAM(S): at 22:49

## 2024-02-26 RX ADMIN — SIMETHICONE 80 MILLIGRAM(S): 80 TABLET, CHEWABLE ORAL at 13:04

## 2024-02-26 RX ADMIN — CALAMINE AND ZINC OXIDE AND PHENOL 1 APPLICATION(S): 160; 10 LOTION TOPICAL at 17:11

## 2024-02-26 RX ADMIN — Medication 30 MILLIGRAM(S): at 00:27

## 2024-02-26 RX ADMIN — Medication 600 MILLIGRAM(S): at 17:11

## 2024-02-26 RX ADMIN — Medication 325 MILLIGRAM(S): at 17:11

## 2024-02-26 RX ADMIN — HEPARIN SODIUM 5000 UNIT(S): 5000 INJECTION INTRAVENOUS; SUBCUTANEOUS at 00:27

## 2024-02-26 RX ADMIN — Medication 975 MILLIGRAM(S): at 09:23

## 2024-02-26 RX ADMIN — Medication 30 MILLIGRAM(S): at 06:12

## 2024-02-26 RX ADMIN — HEPARIN SODIUM 5000 UNIT(S): 5000 INJECTION INTRAVENOUS; SUBCUTANEOUS at 17:11

## 2024-02-26 RX ADMIN — Medication 975 MILLIGRAM(S): at 10:23

## 2024-02-26 RX ADMIN — Medication 1 TABLET(S): at 13:04

## 2024-02-26 RX ADMIN — SIMETHICONE 80 MILLIGRAM(S): 80 TABLET, CHEWABLE ORAL at 21:50

## 2024-02-26 RX ADMIN — SIMETHICONE 80 MILLIGRAM(S): 80 TABLET, CHEWABLE ORAL at 06:12

## 2024-02-26 NOTE — CHART NOTE - NSCHARTNOTEFT_GEN_A_CORE
VIVIANA BRAVO EVENT NOTE     Pt noted to have post op anemia with cbc as below                          7.1    21.47 )-----------( 208      ( 26 Feb 2024 06:00 )             21.1       Orthostatic vitals negative; however pt with tachycardia upon standing     Pt states to feel well, offers no acute complaints, denies chest pain, dizziness, sob, palpitations.   Pt was recommended for blood transfusion at this time.  pt refused stating she feels well and has no symptoms. Pt was explained benefits and risks of transfusing and indication at this time. Pt verbalized understanding and states she still refuses. Blood transfusion refusal signed. Pt on iron / vitamin C and will f/u CBC in am.   case d/w Dr. Ann

## 2024-02-26 NOTE — PROGRESS NOTE ADULT - SUBJECTIVE AND OBJECTIVE BOX
[facility] offers no new [facility]. + Ambulation, voiding without difficulty, - flatus; tolerating regular diet. both breastfeeding and bottle feeding. Denies HA, CP, SOB, N/V/D,  no bm; dizziness, palpitations, worsening abdominal pain, worsening vaginal bleeding, or any other concerns.   Vital Signs Last 24 Hrs  T(C): 36.4 (26 Feb 2024 09:34), Max: 37.4 (25 Feb 2024 14:30)  T(F): 97.6 (26 Feb 2024 09:34), Max: 99.3 (25 Feb 2024 14:30)  HR: 93 (26 Feb 2024 09:34) (77 - 97)  BP: 129/72 (26 Feb 2024 09:34) (106/65 - 137/74)  BP(mean): 91 (25 Feb 2024 14:00) (76 - 91)  RR: 17 (26 Feb 2024 09:34) (12 - 20)  SpO2: 99% (26 Feb 2024 09:34) (97% - 100%)    Parameters below as of 26 Feb 2024 09:34  Patient On (Oxygen Delivery Method): room air        Gen: A&O x 3, NAD  Chest: CTABL  Cardiac: S1+S2+ RRR  Breast: Soft, nontender, nonengorged  Abdomen: +BS; soft; Nontender, nondistended, dressing removed Incision C/D/I steri strips in place   Gyn: Minimal lochia  Extremities: Nontender, DTRS 2+, no worsening edema                        7.1    21.47 )-----------( 208      ( 26 Feb 2024 06:00 )             21.1       A/P: 33 year old POD #1 s/p c/s, intraoperative hemorrhage, preeclampsia without severe features; stable      -oral iron / vitamin C  -orthostatic vitals  -Pain management as needed  -cont post op care  -adv diet to regular   -OOB and ambulate  -f/u Rpt CBC in am   -encourage insentive spirometer use  -Encourage breastfeeding   -d/w dr. Ann

## 2024-02-26 NOTE — PROGRESS NOTE ADULT - PROBLEM SELECTOR PLAN 2
-oral iron / vitamin C  -orthostatic vitals  -Pain management as needed  -cont post op care  -adv diet to regular   -OOB and ambulate  -f/u Rpt CBC in am   -encourage insentive spirometer use  -Encourage breastfeeding   -d/w dr. Ann

## 2024-02-27 VITALS
HEART RATE: 82 BPM | OXYGEN SATURATION: 100 % | RESPIRATION RATE: 18 BRPM | SYSTOLIC BLOOD PRESSURE: 103 MMHG | DIASTOLIC BLOOD PRESSURE: 67 MMHG | TEMPERATURE: 98 F

## 2024-02-27 DIAGNOSIS — N39.0 URINARY TRACT INFECTION, SITE NOT SPECIFIED: ICD-10-CM

## 2024-02-27 DIAGNOSIS — D64.9 ANEMIA, UNSPECIFIED: ICD-10-CM

## 2024-02-27 LAB
BASOPHILS # BLD AUTO: 0.08 K/UL — SIGNIFICANT CHANGE UP (ref 0–0.2)
BASOPHILS NFR BLD AUTO: 0.4 % — SIGNIFICANT CHANGE UP (ref 0–2)
EOSINOPHIL # BLD AUTO: 0.37 K/UL — SIGNIFICANT CHANGE UP (ref 0–0.5)
EOSINOPHIL NFR BLD AUTO: 2 % — SIGNIFICANT CHANGE UP (ref 0–6)
HCT VFR BLD CALC: 21.8 % — LOW (ref 34.5–45)
HGB BLD-MCNC: 7.4 G/DL — LOW (ref 11.5–15.5)
IMM GRANULOCYTES NFR BLD AUTO: 1.6 % — HIGH (ref 0–0.9)
LYMPHOCYTES # BLD AUTO: 13.6 % — SIGNIFICANT CHANGE UP (ref 13–44)
LYMPHOCYTES # BLD AUTO: 2.52 K/UL — SIGNIFICANT CHANGE UP (ref 1–3.3)
MCHC RBC-ENTMCNC: 29.8 PG — SIGNIFICANT CHANGE UP (ref 27–34)
MCHC RBC-ENTMCNC: 33.9 GM/DL — SIGNIFICANT CHANGE UP (ref 32–36)
MCV RBC AUTO: 87.9 FL — SIGNIFICANT CHANGE UP (ref 80–100)
MONOCYTES # BLD AUTO: 1.72 K/UL — HIGH (ref 0–0.9)
MONOCYTES NFR BLD AUTO: 9.3 % — SIGNIFICANT CHANGE UP (ref 2–14)
NEUTROPHILS # BLD AUTO: 13.58 K/UL — HIGH (ref 1.8–7.4)
NEUTROPHILS NFR BLD AUTO: 73.1 % — SIGNIFICANT CHANGE UP (ref 43–77)
NRBC # BLD: 0 /100 WBCS — SIGNIFICANT CHANGE UP (ref 0–0)
PLATELET # BLD AUTO: 233 K/UL — SIGNIFICANT CHANGE UP (ref 150–400)
RBC # BLD: 2.48 M/UL — LOW (ref 3.8–5.2)
RBC # FLD: 13.8 % — SIGNIFICANT CHANGE UP (ref 10.3–14.5)
WBC # BLD: 18.56 K/UL — HIGH (ref 3.8–10.5)
WBC # FLD AUTO: 18.56 K/UL — HIGH (ref 3.8–10.5)

## 2024-02-27 PROCEDURE — 86900 BLOOD TYPING SEROLOGIC ABO: CPT

## 2024-02-27 PROCEDURE — 81001 URINALYSIS AUTO W/SCOPE: CPT

## 2024-02-27 PROCEDURE — 83615 LACTATE (LD) (LDH) ENZYME: CPT

## 2024-02-27 PROCEDURE — 85027 COMPLETE CBC AUTOMATED: CPT

## 2024-02-27 PROCEDURE — 80053 COMPREHEN METABOLIC PANEL: CPT

## 2024-02-27 PROCEDURE — 84550 ASSAY OF BLOOD/URIC ACID: CPT

## 2024-02-27 PROCEDURE — C1726: CPT

## 2024-02-27 PROCEDURE — 86780 TREPONEMA PALLIDUM: CPT

## 2024-02-27 PROCEDURE — 36415 COLL VENOUS BLD VENIPUNCTURE: CPT

## 2024-02-27 PROCEDURE — 85025 COMPLETE CBC W/AUTO DIFF WBC: CPT

## 2024-02-27 PROCEDURE — 87186 SC STD MICRODIL/AGAR DIL: CPT

## 2024-02-27 PROCEDURE — 82570 ASSAY OF URINE CREATININE: CPT

## 2024-02-27 PROCEDURE — 86901 BLOOD TYPING SEROLOGIC RH(D): CPT

## 2024-02-27 PROCEDURE — 85730 THROMBOPLASTIN TIME PARTIAL: CPT

## 2024-02-27 PROCEDURE — 59050 FETAL MONITOR W/REPORT: CPT

## 2024-02-27 PROCEDURE — 87086 URINE CULTURE/COLONY COUNT: CPT

## 2024-02-27 PROCEDURE — 85384 FIBRINOGEN ACTIVITY: CPT

## 2024-02-27 PROCEDURE — 86850 RBC ANTIBODY SCREEN: CPT

## 2024-02-27 PROCEDURE — 85610 PROTHROMBIN TIME: CPT

## 2024-02-27 PROCEDURE — 84156 ASSAY OF PROTEIN URINE: CPT

## 2024-02-27 PROCEDURE — 86923 COMPATIBILITY TEST ELECTRIC: CPT

## 2024-02-27 RX ORDER — ASCORBIC ACID 60 MG
1 TABLET,CHEWABLE ORAL
Qty: 60 | Refills: 0
Start: 2024-02-27 | End: 2024-03-27

## 2024-02-27 RX ORDER — IBUPROFEN 200 MG
1 TABLET ORAL
Qty: 60 | Refills: 1
Start: 2024-02-27 | End: 2024-03-27

## 2024-02-27 RX ORDER — SIMETHICONE 80 MG/1
1 TABLET, CHEWABLE ORAL
Qty: 90 | Refills: 0
Start: 2024-02-27 | End: 2024-03-12

## 2024-02-27 RX ORDER — FAMOTIDINE 10 MG/ML
20 INJECTION INTRAVENOUS
Refills: 0 | Status: DISCONTINUED | OUTPATIENT
Start: 2024-02-27 | End: 2024-02-27

## 2024-02-27 RX ORDER — OXYCODONE HYDROCHLORIDE 5 MG/1
5 TABLET ORAL
Refills: 0 | Status: DISCONTINUED | OUTPATIENT
Start: 2024-02-27 | End: 2024-02-27

## 2024-02-27 RX ORDER — FAMOTIDINE 10 MG/ML
1 INJECTION INTRAVENOUS
Qty: 20 | Refills: 0
Start: 2024-02-27 | End: 2024-03-07

## 2024-02-27 RX ORDER — ACETAMINOPHEN 500 MG
2 TABLET ORAL
Qty: 120 | Refills: 1
Start: 2024-02-27 | End: 2024-03-27

## 2024-02-27 RX ORDER — CEPHALEXIN 500 MG
1 CAPSULE ORAL
Qty: 13 | Refills: 0
Start: 2024-02-27 | End: 2024-03-04

## 2024-02-27 RX ORDER — SENNOSIDES/DOCUSATE SODIUM 8.6MG-50MG
1 TABLET ORAL
Qty: 60 | Refills: 0
Start: 2024-02-27 | End: 2024-03-27

## 2024-02-27 RX ORDER — FERROUS SULFATE 325(65) MG
1 TABLET ORAL
Qty: 30 | Refills: 0
Start: 2024-02-27 | End: 2024-03-27

## 2024-02-27 RX ORDER — CEPHALEXIN 500 MG
500 CAPSULE ORAL EVERY 12 HOURS
Refills: 0 | Status: DISCONTINUED | OUTPATIENT
Start: 2024-02-27 | End: 2024-02-27

## 2024-02-27 RX ORDER — SENNA PLUS 8.6 MG/1
2 TABLET ORAL
Qty: 20 | Refills: 0
Start: 2024-02-27 | End: 2024-03-07

## 2024-02-27 RX ADMIN — Medication 500 MILLIGRAM(S): at 05:55

## 2024-02-27 RX ADMIN — Medication 325 MILLIGRAM(S): at 05:56

## 2024-02-27 RX ADMIN — Medication 600 MILLIGRAM(S): at 05:56

## 2024-02-27 RX ADMIN — Medication 600 MILLIGRAM(S): at 12:30

## 2024-02-27 RX ADMIN — HEPARIN SODIUM 5000 UNIT(S): 5000 INJECTION INTRAVENOUS; SUBCUTANEOUS at 08:39

## 2024-02-27 RX ADMIN — MAGNESIUM HYDROXIDE 30 MILLILITER(S): 400 TABLET, CHEWABLE ORAL at 08:39

## 2024-02-27 RX ADMIN — Medication 600 MILLIGRAM(S): at 11:57

## 2024-02-27 RX ADMIN — SIMETHICONE 80 MILLIGRAM(S): 80 TABLET, CHEWABLE ORAL at 05:56

## 2024-02-27 RX ADMIN — Medication 600 MILLIGRAM(S): at 00:33

## 2024-02-27 RX ADMIN — Medication 975 MILLIGRAM(S): at 05:09

## 2024-02-27 RX ADMIN — SIMETHICONE 80 MILLIGRAM(S): 80 TABLET, CHEWABLE ORAL at 01:20

## 2024-02-27 RX ADMIN — Medication 1 TABLET(S): at 11:56

## 2024-02-27 RX ADMIN — Medication 1 MILLIGRAM(S): at 11:56

## 2024-02-27 RX ADMIN — HEPARIN SODIUM 5000 UNIT(S): 5000 INJECTION INTRAVENOUS; SUBCUTANEOUS at 00:33

## 2024-02-27 RX ADMIN — Medication 600 MILLIGRAM(S): at 01:33

## 2024-02-27 NOTE — DISCHARGE NOTE OB - CARE PROVIDER_API CALL
Santosh Padron  Obstetrics and Gynecology  [address], [address]  [place] [address]  Phone: [phone]  Fax: [phone]  Established Patient  Follow Up Time: 1 week

## 2024-02-27 NOTE — PROGRESS NOTE ADULT - ASSESSMENT
late entry due to acute pt care  pt seen w pp team  PA NOTE:  POD#2  s/p: primary cs failed induction   in the room   induced for PEC w/o SF at 37 6/7wks bp <140/90 home care for bp check at home to be set up ; erx: bp kit  code fusion   pt started h/h: 9.9/29.6 post op day 1 h/h: 7.1/21.1- pt signed refusal of blood transfusion yesterday as it was recommended: pt rpt h/h today 7.4/21.8 asymp hemodynamically stable  dw dr hunt: pt is requesting dc home today with parameters and instructions to see dr hunt this coming 3/3/2024 for wound check bp check   pt also c/o persistent dysuria- pt described that it is the same as her UTI sx: pt is aware that urine cultures will result in 48hrs- base on her symptoms and H/o UTI- dr hunt treating pt with cephalexin 500mg q12hrs for 7days                          7.4    18.56 )-----------( 233      ( 2024 06:46 )             21.8   - dc home today as per dr hunt once home care set up   - dc class today encouraged to attend  - bp kit erx  - instructions: no sex nothing in vagina no heavy lifting no pushing no straining no strenuous activities  pain medication as needed; stool softener; dulcolax as needed if constipated  walk for exercise: helps recovery   continue prenatal vitamins daily especially whole course of breastfeeding  see your OB in the office for follow up post partum check call the office set up appointment in 1-2weeks  clean wound daily with soap and water; please note wound for redness or swelling; if noted go to the office right away so the doctor can evaluate the wound for possible wound infection    see obstetrician in 7days for blood pressure check in the office  make sure you check your blood pressure 4xday and record all the pressures for the doctor to see  return to the emergency room if the blood pressure is >150 and or >90  
A/P: 33 year old POD #1 s/p c/s, intraoperative hemorrhage, preeclampsia without severe features; stable

## 2024-02-27 NOTE — DISCHARGE NOTE OB - CARE PLAN
1 Principal Discharge DX:	 delivery delivered  Assessment and plan of treatment:	no sex nothing in vagina no heavy lifting no pushing no straining no strenuous activities  pain medication as needed; stool softener; dulcolax as needed if constipated  walk for exercise: helps recovery   continue prenatal vitamins daily especially whole course of breastfeeding  see your OB in the office for follow up post partum check call the office set up appointment in 1-2weeks  clean wound daily with soap and water; please note wound for redness or swelling; if noted go to the office right away so the doctor can evaluate the wound for possible wound infection  Secondary Diagnosis:	Preeclampsia  Assessment and plan of treatment:	see obstetrician in 7days for blood pressure check in the office  make sure you check your blood pressure 4xday and record all the pressures for the doctor to see  return to the emergency room if the blood pressure is >150 and or >90    home care nurse coming to check your blood pressure at home  Secondary Diagnosis:	Postoperative anemia due to acute blood loss  Assessment and plan of treatment:	iron supplement  prenatal vitamins   vitamin c  Secondary Diagnosis:	Acute urinary tract infection  Assessment and plan of treatment:	cephalexin antibiotics to complete whole course for 7days   and see dr hunt in 1week to check the urine test

## 2024-02-27 NOTE — DISCHARGE NOTE OB - ADDITIONAL INSTRUCTIONS
see dr hunt in 1week for bp check /urine check / wound check    to ER if the BP is persistent >150/90

## 2024-02-27 NOTE — LACTATION INITIAL EVALUATION - LACTATION INTERVENTIONS
pt. breast and frmla feeds but mostly formula feeds; Breastfeeding on cue 8-12X/24 hours with diaper count to assess for adequate intake, safe skin to skin and rooming-in encouraged. shceduled for d/c home today; Reinforced benefits of  exclusive breastfeeding for first 6 months and provided with breastfeeding community resource list for breastfeeding support/assistance available post-discharge and of importance of early f/u with pediatrician within 2-3 days.  Declined Referral to Tele-lactation program/initiate/review safe skin-to-skin/initiate/review hand expression/initiate/review techniques for position and latch/post discharge community resources provided/initiate/review nipple shield use/review techniques to increase milk supply/review techniques to manage sore nipples/engorgement/reviewed components of an effective feeding and at least 8 effective feedings per day required/reviewed importance of monitoring infant diapers, the breastfeeding log, and minimum output each day/reviewed benefits and recommendations for rooming in/reviewed feeding on demand/by cue at least 8 times a day/reviewed indications of inadequate milk transfer that would require supplementation
pt. c/o difficulty latching; pt. noted to have flat nipples; provided with nipple shield and explained risks/benefits, proper use and cleaning; pt. Supplements with formula; safe formula feeding reviewed/initiate/review safe skin-to-skin/initiate/review hand expression/initiate/review techniques for position and latch/initiate/review nipple shield use/review techniques to increase milk supply/reviewed components of an effective feeding and at least 8 effective feedings per day required/reviewed importance of monitoring infant diapers, the breastfeeding log, and minimum output each day/reviewed benefits and recommendations for rooming in/reviewed feeding on demand/by cue at least 8 times a day

## 2024-02-27 NOTE — LACTATION INITIAL EVALUATION - AS DELIV COMPLICATIONS OB
miol for PEC, failed induction;see Delivery Summary/peripartum hemorrhage/pre eclampsia
miol for PEC, failed induction;see Delivery Summary/peripartum hemorrhage/pre eclampsia

## 2024-02-27 NOTE — DISCHARGE NOTE OB - HOSPITAL COURSE
s/p primary c/s for failed induction at 37w6d,  induced for pec without -home care set up   bp kit erx  ; CODE FUSION hemodynamically stable: patient signed refusal of blood transfusion recommended; h/h is not <7  and starting h/h prior to csection: 9.9/29.6  discharged home with h/h: 7.4/21.8    patient requested discharge home post op day 2  instructed to see dr hunt in 1week for bp check/ wound check / urine check    complained of acute UTI symptoms: dysuria persistently - cephalexin antibiotics ordered by dr hunt   discharged home on the antibiotics

## 2024-02-27 NOTE — DISCHARGE NOTE OB - MEDICATION SUMMARY - MEDICATIONS TO TAKE
I will START or STAY ON the medications listed below when I get home from the hospital:    blood pressure monitor-large cuff  -- check your blood pressure every 4hours while awake after 30minute rest  -- Indication: For for blood pressure check     mg oral tablet  -- 1 tab(s) by mouth every 6 hours as needed for  moderate pain  -- Indication: For for pain    acetaminophen 500 mg oral capsule  -- 2 cap(s) by mouth every 6 hours as needed for  mild pain  -- Indication: For for pain    cephalexin 500 mg oral tablet  -- 1 tab(s) by mouth every 12 hours  -- Indication: For for urinary tract infection    famotidine 20 mg oral tablet  -- 1 tab(s) by mouth 2 times a day  -- Indication: For Prevents gastritis    Prenatal Elite oral capsule  -- 1 cap(s) by mouth once a day  -- Indication: For Supplement    ferrous sulfate (as elemental iron) 45 mg oral tablet, extended release  -- 1 tab(s) by mouth once a day  -- Indication: For anemia post csection    senna leaf extract oral tablet  -- 2 tab(s) by mouth once a day  -- Indication: For for bowel movemnt    Colace 2-in-1 50 mg-8.6 mg oral tablet  -- 1 tab(s) by mouth 2 times a day  -- Indication: For for bowel movement    simethicone 80 mg oral tablet, chewable  -- 1 tab(s) chewed every 4 hours  -- Indication: For Helps pass gas    ascorbic acid 500 mg oral tablet  -- 1 tab(s) by mouth 2 times a day  -- Indication: For Helps absorb iron

## 2024-02-27 NOTE — DISCHARGE NOTE OB - NS MD DC FALL RISK RISK
For information on Fall & Injury Prevention, visit: [email or web address] OR  [email or web address] OR  https://www.cdc.gov/steadi/patient.html

## 2024-02-27 NOTE — DISCHARGE NOTE OB - PATIENT PORTAL LINK FT
You can access the FollowMyHealth Patient Portal offered by [facility] by registering at the following website: [email or web address]. By joining [employer]’s FollowMyHealth portal, you will also be able to view your health information using other applications (apps) compatible with our system.

## 2024-02-27 NOTE — PROGRESS NOTE ADULT - SUBJECTIVE AND OBJECTIVE BOX
late entry due to acute pt care  pt seen w pp team  PA NOTE:  POD#2  s/p: primary cs failed induction   in the room   induced for PEC w/o SF at 37 6/7wks bp <140/90 home care for bp check at home to be set up ; erx: bp kit  code fusion   pt started h/h: 9.9/29.6 post op day 1 h/h: 7.1/21.1- pt signed refusal of blood transfusion yesterday as it was recommended: pt rpt h/h today 7.4/21.8 asymp hemodynamically stable  dw dr hunt: pt is requesting dc home today with parameters and instructions to see dr hunt this coming 3/3/2024 for wound check bp check   pt also c/o persistent dysuria- pt described that it is the same as her UTI sx: pt is aware that urine cultures will result in 48hrs- base on her symptoms and H/o UTI- dr hunt treating pt with cephalexin 500mg q12hrs for 7days      Patient seen at bedisde resting comfortably. + Ambulation, + void without difficulty, + flatus; tolerating regular diet. both breastfeeding and bottle feeding. Denies HA, CP, SOB, N/V/D,  no bm; dizziness, palpitations, worsening abdominal pain, worsening vaginal bleeding, or any other concerns.     Vital Signs Last 24 Hrs  T(C): 36.8 (2024 05:35), Max: 36.9 (2024 13:26)  T(F): 98.2 (2024 05:35), Max: 98.5 (2024 13:26)  HR: 82 (2024 05:35) (82 - 106)  BP: 103/67 (2024 05:35) (103/67 - 127/76)  BP(mean): --  RR: 18 (2024 05:35) (17 - 19)  SpO2: 100% (2024 05:35) (97% - 100%)    Parameters below as of 2024 05:35  Patient On (Oxygen Delivery Method): room air      CAPILLARY BLOOD GLUCOSE          Gen: A&O x 3, NAD  Chest: CTABL  Cardiac: S1+S2+ RRR  Breast: Soft, nontender, nonengorged  Abdomen: +BS; soft; Nontender, nondistended,   incision site: C/D/I steri strips in place   Gyn: Minimal lochia  Extremities: Nontender, DTRS 2+, no worsening edema

## 2024-02-27 NOTE — DISCHARGE NOTE OB - MATERIALS PROVIDED
Vaccinations/[facility]  Screening Program/  Immunization Record/Breastfeeding Mother’s Support Group Information/Guide to Postpartum Care/[facility] Hearing Screen Program/Back To Sleep Handout/Shaken Baby Prevention Handout/Breastfeeding Guide and Packet/Birth Certificate Instructions

## 2024-02-27 NOTE — DISCHARGE NOTE OB - PLAN OF CARE
no sex nothing in vagina no heavy lifting no pushing no straining no strenuous activities  pain medication as needed; stool softener; dulcolax as needed if constipated  walk for exercise: helps recovery   continue prenatal vitamins daily especially whole course of breastfeeding  see your OB in the office for follow up post partum check call the office set up appointment in 1-2weeks  clean wound daily with soap and water; please note wound for redness or swelling; if noted go to the office right away so the doctor can evaluate the wound for possible wound infection iron supplement  prenatal vitamins   vitamin c cephalexin antibiotics to complete whole course for 7days   and see dr hunt in 1week to check the urine test see obstetrician in 7days for blood pressure check in the office  make sure you check your blood pressure 4xday and record all the pressures for the doctor to see  return to the emergency room if the blood pressure is >150 and or >90    home care nurse coming to check your blood pressure at home

## 2024-02-29 LAB
-  AMOXICILLIN/CLAVULANIC ACID: SIGNIFICANT CHANGE UP
-  AMPICILLIN/SULBACTAM: SIGNIFICANT CHANGE UP
-  AMPICILLIN: SIGNIFICANT CHANGE UP
-  AZTREONAM: SIGNIFICANT CHANGE UP
-  CEFAZOLIN: SIGNIFICANT CHANGE UP
-  CEFEPIME: SIGNIFICANT CHANGE UP
-  CEFTRIAXONE: SIGNIFICANT CHANGE UP
-  CEFUROXIME: SIGNIFICANT CHANGE UP
-  CIPROFLOXACIN: SIGNIFICANT CHANGE UP
-  ERTAPENEM: SIGNIFICANT CHANGE UP
-  GENTAMICIN: SIGNIFICANT CHANGE UP
-  IMIPENEM: SIGNIFICANT CHANGE UP
-  LEVOFLOXACIN: SIGNIFICANT CHANGE UP
-  MEROPENEM: SIGNIFICANT CHANGE UP
-  NITROFURANTOIN: SIGNIFICANT CHANGE UP
-  PIPERACILLIN/TAZOBACTAM: SIGNIFICANT CHANGE UP
-  TOBRAMYCIN: SIGNIFICANT CHANGE UP
-  TRIMETHOPRIM/SULFAMETHOXAZOLE: SIGNIFICANT CHANGE UP
CULTURE RESULTS: ABNORMAL
METHOD TYPE: SIGNIFICANT CHANGE UP
ORGANISM # SPEC MICROSCOPIC CNT: ABNORMAL
ORGANISM # SPEC MICROSCOPIC CNT: ABNORMAL
SPECIMEN SOURCE: SIGNIFICANT CHANGE UP

## 2024-04-03 NOTE — OB RN PREOPERATIVE CHECKLIST - IDENTIFICATION BAND VERIFIED
RN spoke with parent in regards to results from x-ray. All questions and concerns addressed. RN provided information for endocrinology scheduling. Parent/guardian verbalizes understanding and agrees to plan of care.      done